# Patient Record
Sex: MALE | Race: BLACK OR AFRICAN AMERICAN | URBAN - METROPOLITAN AREA
[De-identification: names, ages, dates, MRNs, and addresses within clinical notes are randomized per-mention and may not be internally consistent; named-entity substitution may affect disease eponyms.]

---

## 2020-07-14 ENCOUNTER — OFFICE VISIT (OUTPATIENT)
Dept: URBAN - METROPOLITAN AREA CLINIC 97 | Facility: CLINIC | Age: 30
End: 2020-07-14
Payer: COMMERCIAL

## 2020-07-14 VITALS
HEIGHT: 73 IN | WEIGHT: 163 LBS | SYSTOLIC BLOOD PRESSURE: 120 MMHG | DIASTOLIC BLOOD PRESSURE: 64 MMHG | TEMPERATURE: 97.8 F | BODY MASS INDEX: 21.6 KG/M2 | RESPIRATION RATE: 16 BRPM

## 2020-07-14 DIAGNOSIS — K50.80 CROHN'S COLITIS: ICD-10-CM

## 2020-07-14 PROCEDURE — 96413 CHEMO IV INFUSION 1 HR: CPT | Performed by: INTERNAL MEDICINE

## 2020-07-14 PROCEDURE — 96415 CHEMO IV INFUSION ADDL HR: CPT | Performed by: INTERNAL MEDICINE

## 2020-07-14 RX ORDER — INFLIXIMAB 100 MG/10ML
INFUSE 5 MG/KG OVER NO LESS THAN 2 HOUR(S) BY INTRAVENOUS ROUTE  START Q0,2 6 WEEKS AND THEN Q 8 WEEKS FOR 90 DAYS INJECTION, POWDER, LYOPHILIZED, FOR SOLUTION INTRAVENOUS
Qty: 5 | Refills: 3 | Status: ACTIVE | COMMUNITY
Start: 2018-02-19 | End: 1900-01-01

## 2020-11-17 ENCOUNTER — OFFICE VISIT (OUTPATIENT)
Dept: URBAN - METROPOLITAN AREA CLINIC 97 | Facility: CLINIC | Age: 30
End: 2020-11-17

## 2020-11-17 ENCOUNTER — TELEPHONE ENCOUNTER (OUTPATIENT)
Dept: URBAN - METROPOLITAN AREA CLINIC 6 | Facility: CLINIC | Age: 30
End: 2020-11-17

## 2020-11-17 RX ORDER — INFLIXIMAB 100 MG/10ML
INFUSE 5 MG/KG OVER NO LESS THAN 2 HOUR(S) BY INTRAVENOUS ROUTE  START Q0,2 6 WEEKS AND THEN Q 8 WEEKS FOR 90 DAYS INJECTION, POWDER, LYOPHILIZED, FOR SOLUTION INTRAVENOUS
Qty: 5 | Refills: 3 | Status: ACTIVE | COMMUNITY
Start: 2018-02-19 | End: 1900-01-01

## 2020-11-18 ENCOUNTER — WEB ENCOUNTER (OUTPATIENT)
Dept: URBAN - METROPOLITAN AREA CLINIC 115 | Facility: CLINIC | Age: 30
End: 2020-11-18

## 2020-11-18 ENCOUNTER — OFFICE VISIT (OUTPATIENT)
Dept: URBAN - METROPOLITAN AREA CLINIC 115 | Facility: CLINIC | Age: 30
End: 2020-11-18
Payer: COMMERCIAL

## 2020-11-18 ENCOUNTER — LAB OUTSIDE AN ENCOUNTER (OUTPATIENT)
Dept: URBAN - METROPOLITAN AREA CLINIC 115 | Facility: CLINIC | Age: 30
End: 2020-11-18

## 2020-11-18 DIAGNOSIS — L21.8 SEBORRHEA CORPORIS: ICD-10-CM

## 2020-11-18 DIAGNOSIS — L21.0 SEBORRHEA CAPITIS IN ADULT: ICD-10-CM

## 2020-11-18 DIAGNOSIS — K50.80 CROHN'S DISEASE OF BOTH SMALL AND LARGE INTESTINE WITHOUT COMPLICATION: ICD-10-CM

## 2020-11-18 DIAGNOSIS — K50.90 CROHN'S DISEASE: ICD-10-CM

## 2020-11-18 PROCEDURE — 82607 VITAMIN B-12: CPT | Performed by: INTERNAL MEDICINE

## 2020-11-18 PROCEDURE — 86480 TB TEST CELL IMMUN MEASURE: CPT | Performed by: INTERNAL MEDICINE

## 2020-11-18 PROCEDURE — 99214 OFFICE O/P EST MOD 30 MIN: CPT | Performed by: INTERNAL MEDICINE

## 2020-11-18 RX ORDER — SELENIUM SULFIDE 23 MG/ML
AS DIRECTED SHAMPOO TOPICAL
Qty: 1 BOTTLE | Refills: 1 | OUTPATIENT
Start: 2020-11-18 | End: 2021-01-17

## 2020-11-18 RX ORDER — INFLIXIMAB 100 MG/10ML
INFUSE 5 MG/KG OVER NO LESS THAN 2 HOUR(S) BY INTRAVENOUS ROUTE  START Q0,2 6 WEEKS AND THEN Q 8 WEEKS FOR 90 DAYS INJECTION, POWDER, LYOPHILIZED, FOR SOLUTION INTRAVENOUS
Qty: 5 | Refills: 3 | Status: ACTIVE | COMMUNITY
Start: 2018-02-19

## 2020-11-18 RX ORDER — SODIUM, POTASSIUM,MAG SULFATES 17.5-3.13G
344 ML SOLUTION, RECONSTITUTED, ORAL ORAL
Qty: 1 BOX | Refills: 0 | OUTPATIENT
Start: 2020-11-18 | End: 2020-11-19

## 2020-11-18 NOTE — PHYSICAL EXAM CHEST:
no lesions,  no deformities,  no traumatic injuries,  no significant scars are present,  chest wall non-tender,  no masses present, breathing is unlabored without accessory muscle use,normal breath sounds No

## 2020-11-18 NOTE — HPI-TODAY'S VISIT:
31 yo AAM here for annual visit. He had crohn's since 2011 and was on remicade for 8 years. Last colonoscopy was in 2018. He was last seen in office 11/2019 and last remicade was 7/2020. He only received infusions twice this year. He reports no reasons for non compliance. he reports being asymptomatic.

## 2020-12-03 LAB
A/G RATIO: 1.8
ALBUMIN: 4
ALKALINE PHOSPHATASE: 72
ALT (SGPT): 14
ANTI-INFLIXIMAB ANTIBODY: <22
AST (SGOT): 18
BASO (ABSOLUTE): 0
BASOS: 1
BILIRUBIN, TOTAL: 0.5
BUN/CREATININE RATIO: 13
BUN: 13
C-REACTIVE PROTEIN, QUANT: 4
CALCIUM: 9.2
CARBON DIOXIDE, TOTAL: 24
CHLORIDE: 104
COMMENT:: (no result)
CREATININE: 0.97
EGFR IF AFRICN AM: 122
EGFR IF NONAFRICN AM: 106
EOS (ABSOLUTE): 0.1
EOS: 2
GLOBULIN, TOTAL: 2.2
GLUCOSE: 87
HBSAG SCREEN: NEGATIVE
HCV AB: <0.1
HEMATOCRIT: 43.7
HEMATOLOGY COMMENTS:: (no result)
HEMOGLOBIN: 14.8
HEP B CORE AB, TOT: NEGATIVE
HEPATITIS B SURF AB QUANT: <3.1
IMMATURE CELLS: (no result)
IMMATURE GRANS (ABS): 0
IMMATURE GRANULOCYTES: 0
INFLIXIMAB DRUG LEVEL: <0.4
LYMPHS (ABSOLUTE): 1.1
LYMPHS: 25
MCH: 31
MCHC: 33.9
MCV: 92
MONOCYTES(ABSOLUTE): 0.4
MONOCYTES: 10
NEUTROPHILS (ABSOLUTE): 2.7
NEUTROPHILS: 62
NRBC: (no result)
PLATELETS: 214
POTASSIUM: 4.6
PROTEIN, TOTAL: 6.2
QUANTIFERON CRITERIA: (no result)
QUANTIFERON INCUBATION: (no result)
QUANTIFERON MITOGEN VALUE: >10
QUANTIFERON NIL VALUE: 0.88
QUANTIFERON TB1 AG VALUE: 0.67
QUANTIFERON TB2 AG VALUE: 0.54
QUANTIFERON-TB GOLD PLUS: NEGATIVE
RBC: 4.77
RDW: 12.7
SODIUM: 141
VITAMIN B12: 329
WBC: 4.4

## 2020-12-05 ENCOUNTER — TELEPHONE ENCOUNTER (OUTPATIENT)
Dept: URBAN - METROPOLITAN AREA CLINIC 92 | Facility: CLINIC | Age: 30
End: 2020-12-05

## 2020-12-05 RX ORDER — HYDROCORTISONE SODIUM SUCCINATE 100 MG/2ML
AS DIRECTED INJECTION, POWDER, FOR SOLUTION INTRAMUSCULAR; INTRAVENOUS
Qty: 1 | Refills: 0 | OUTPATIENT
Start: 2020-12-05 | End: 2020-12-06

## 2020-12-05 RX ORDER — INFLIXIMAB 100 MG/10ML
AS DIRECTED INJECTION, POWDER, LYOPHILIZED, FOR SOLUTION INTRAVENOUS
Qty: 1 | Refills: 0 | OUTPATIENT
Start: 2020-12-05 | End: 2020-12-06

## 2020-12-12 ENCOUNTER — LAB OUTSIDE AN ENCOUNTER (OUTPATIENT)
Dept: URBAN - METROPOLITAN AREA CLINIC 92 | Facility: CLINIC | Age: 30
End: 2020-12-12

## 2020-12-22 ENCOUNTER — OFFICE VISIT (OUTPATIENT)
Dept: URBAN - METROPOLITAN AREA SURGERY CENTER 13 | Facility: SURGERY CENTER | Age: 30
End: 2020-12-22

## 2021-01-14 ENCOUNTER — OFFICE VISIT (OUTPATIENT)
Dept: URBAN - METROPOLITAN AREA CLINIC 91 | Facility: CLINIC | Age: 31
End: 2021-01-14

## 2021-01-26 ENCOUNTER — CLAIMS CREATED FROM THE CLAIM WINDOW (OUTPATIENT)
Dept: URBAN - METROPOLITAN AREA CLINIC 4 | Facility: CLINIC | Age: 31
End: 2021-01-26
Payer: COMMERCIAL

## 2021-01-26 ENCOUNTER — OFFICE VISIT (OUTPATIENT)
Dept: URBAN - METROPOLITAN AREA SURGERY CENTER 13 | Facility: SURGERY CENTER | Age: 31
End: 2021-01-26
Payer: COMMERCIAL

## 2021-01-26 DIAGNOSIS — K63.89 OTHER SPECIFIED DISEASES OF INTESTINE: ICD-10-CM

## 2021-01-26 DIAGNOSIS — K63.3 ULCER OF INTESTINE: ICD-10-CM

## 2021-01-26 DIAGNOSIS — Z98.0 H/O BILLROTH II OPERATION: ICD-10-CM

## 2021-01-26 DIAGNOSIS — K50.80 CROHN'S COLITIS: ICD-10-CM

## 2021-01-26 PROCEDURE — 45380 COLONOSCOPY AND BIOPSY: CPT | Performed by: INTERNAL MEDICINE

## 2021-01-26 PROCEDURE — 88305 TISSUE EXAM BY PATHOLOGIST: CPT | Performed by: PATHOLOGY

## 2021-01-26 PROCEDURE — G8907 PT DOC NO EVENTS ON DISCHARG: HCPCS | Performed by: INTERNAL MEDICINE

## 2021-01-26 PROCEDURE — 88342 IMHCHEM/IMCYTCHM 1ST ANTB: CPT | Performed by: PATHOLOGY

## 2021-01-26 PROCEDURE — 88341 IMHCHEM/IMCYTCHM EA ADD ANTB: CPT | Performed by: PATHOLOGY

## 2021-01-29 ENCOUNTER — OFFICE VISIT (OUTPATIENT)
Dept: URBAN - METROPOLITAN AREA CLINIC 91 | Facility: CLINIC | Age: 31
End: 2021-01-29
Payer: COMMERCIAL

## 2021-01-29 VITALS
WEIGHT: 167 LBS | DIASTOLIC BLOOD PRESSURE: 77 MMHG | HEART RATE: 86 BPM | TEMPERATURE: 97.5 F | RESPIRATION RATE: 17 BRPM | HEIGHT: 73 IN | SYSTOLIC BLOOD PRESSURE: 116 MMHG | BODY MASS INDEX: 22.13 KG/M2

## 2021-01-29 DIAGNOSIS — K50.80 CROHN'S COLITIS: ICD-10-CM

## 2021-01-29 PROCEDURE — 96413 CHEMO IV INFUSION 1 HR: CPT | Performed by: INTERNAL MEDICINE

## 2021-01-29 PROCEDURE — 96415 CHEMO IV INFUSION ADDL HR: CPT | Performed by: INTERNAL MEDICINE

## 2021-01-29 RX ORDER — INFLIXIMAB 100 MG/10ML
INFUSE 5 MG/KG OVER NO LESS THAN 2 HOUR(S) BY INTRAVENOUS ROUTE  START Q0,2 6 WEEKS AND THEN Q 8 WEEKS FOR 90 DAYS INJECTION, POWDER, LYOPHILIZED, FOR SOLUTION INTRAVENOUS
Qty: 5 | Refills: 3 | Status: ACTIVE | COMMUNITY
Start: 2018-02-19

## 2021-03-01 ENCOUNTER — OFFICE VISIT (OUTPATIENT)
Dept: URBAN - METROPOLITAN AREA CLINIC 114 | Facility: CLINIC | Age: 31
End: 2021-03-01

## 2021-04-22 ENCOUNTER — OFFICE VISIT (OUTPATIENT)
Dept: URBAN - METROPOLITAN AREA CLINIC 91 | Facility: CLINIC | Age: 31
End: 2021-04-22
Payer: COMMERCIAL

## 2021-04-22 VITALS
RESPIRATION RATE: 18 BRPM | HEART RATE: 78 BPM | WEIGHT: 173.6 LBS | HEIGHT: 73 IN | SYSTOLIC BLOOD PRESSURE: 113 MMHG | BODY MASS INDEX: 23.01 KG/M2 | DIASTOLIC BLOOD PRESSURE: 76 MMHG | TEMPERATURE: 95 F

## 2021-04-22 DIAGNOSIS — K50.80 CROHN'S COLITIS: ICD-10-CM

## 2021-04-22 PROCEDURE — 96415 CHEMO IV INFUSION ADDL HR: CPT | Performed by: INTERNAL MEDICINE

## 2021-04-22 PROCEDURE — 96413 CHEMO IV INFUSION 1 HR: CPT | Performed by: INTERNAL MEDICINE

## 2021-04-22 RX ORDER — INFLIXIMAB 100 MG/10ML
INFUSE 5 MG/KG OVER NO LESS THAN 2 HOUR(S) BY INTRAVENOUS ROUTE  START Q0,2 6 WEEKS AND THEN Q 8 WEEKS FOR 90 DAYS INJECTION, POWDER, LYOPHILIZED, FOR SOLUTION INTRAVENOUS
Qty: 5 | Refills: 3 | Status: ACTIVE | COMMUNITY
Start: 2018-02-19

## 2021-06-23 ENCOUNTER — OFFICE VISIT (OUTPATIENT)
Dept: URBAN - METROPOLITAN AREA CLINIC 91 | Facility: CLINIC | Age: 31
End: 2021-06-23
Payer: COMMERCIAL

## 2021-06-23 VITALS
SYSTOLIC BLOOD PRESSURE: 115 MMHG | DIASTOLIC BLOOD PRESSURE: 77 MMHG | HEART RATE: 74 BPM | HEIGHT: 73 IN | BODY MASS INDEX: 23.19 KG/M2 | WEIGHT: 175 LBS | TEMPERATURE: 98.4 F | RESPIRATION RATE: 17 BRPM

## 2021-06-23 DIAGNOSIS — K50.80 CROHN'S COLITIS: ICD-10-CM

## 2021-06-23 PROCEDURE — 96413 CHEMO IV INFUSION 1 HR: CPT | Performed by: INTERNAL MEDICINE

## 2021-06-23 PROCEDURE — 96415 CHEMO IV INFUSION ADDL HR: CPT | Performed by: INTERNAL MEDICINE

## 2021-06-23 RX ORDER — INFLIXIMAB 100 MG/10ML
INFUSE 5 MG/KG OVER NO LESS THAN 2 HOUR(S) BY INTRAVENOUS ROUTE  START Q0,2 6 WEEKS AND THEN Q 8 WEEKS FOR 90 DAYS INJECTION, POWDER, LYOPHILIZED, FOR SOLUTION INTRAVENOUS
Qty: 5 | Refills: 3 | Status: ACTIVE | COMMUNITY
Start: 2018-02-19

## 2021-10-12 ENCOUNTER — TELEPHONE ENCOUNTER (OUTPATIENT)
Dept: URBAN - METROPOLITAN AREA CLINIC 82 | Facility: CLINIC | Age: 31
End: 2021-10-12

## 2021-10-20 ENCOUNTER — OFFICE VISIT (OUTPATIENT)
Dept: URBAN - METROPOLITAN AREA CLINIC 91 | Facility: CLINIC | Age: 31
End: 2021-10-20
Payer: COMMERCIAL

## 2021-10-20 VITALS
HEIGHT: 73 IN | RESPIRATION RATE: 18 BRPM | DIASTOLIC BLOOD PRESSURE: 64 MMHG | BODY MASS INDEX: 22.93 KG/M2 | TEMPERATURE: 98.3 F | HEART RATE: 86 BPM | SYSTOLIC BLOOD PRESSURE: 125 MMHG | WEIGHT: 173 LBS

## 2021-10-20 DIAGNOSIS — K50.80 CROHN'S COLITIS: ICD-10-CM

## 2021-10-20 PROCEDURE — 96413 CHEMO IV INFUSION 1 HR: CPT | Performed by: INTERNAL MEDICINE

## 2021-10-20 PROCEDURE — 96415 CHEMO IV INFUSION ADDL HR: CPT | Performed by: INTERNAL MEDICINE

## 2021-10-20 RX ORDER — INFLIXIMAB 100 MG/10ML
INFUSE 5 MG/KG OVER NO LESS THAN 2 HOUR(S) BY INTRAVENOUS ROUTE  START Q0,2 6 WEEKS AND THEN Q 8 WEEKS FOR 90 DAYS INJECTION, POWDER, LYOPHILIZED, FOR SOLUTION INTRAVENOUS
Qty: 5 | Refills: 3 | Status: ACTIVE | COMMUNITY
Start: 2018-02-19

## 2022-01-27 ENCOUNTER — TELEPHONE ENCOUNTER (OUTPATIENT)
Dept: URBAN - METROPOLITAN AREA CLINIC 115 | Facility: CLINIC | Age: 32
End: 2022-01-27

## 2022-01-31 ENCOUNTER — TELEPHONE ENCOUNTER (OUTPATIENT)
Dept: URBAN - METROPOLITAN AREA CLINIC 91 | Facility: CLINIC | Age: 32
End: 2022-01-31

## 2022-01-31 ENCOUNTER — OFFICE VISIT (OUTPATIENT)
Dept: URBAN - METROPOLITAN AREA CLINIC 91 | Facility: CLINIC | Age: 32
End: 2022-01-31
Payer: COMMERCIAL

## 2022-01-31 VITALS
HEART RATE: 81 BPM | DIASTOLIC BLOOD PRESSURE: 75 MMHG | HEIGHT: 73 IN | RESPIRATION RATE: 16 BRPM | BODY MASS INDEX: 22.19 KG/M2 | SYSTOLIC BLOOD PRESSURE: 132 MMHG | WEIGHT: 167.4 LBS | TEMPERATURE: 97.7 F

## 2022-01-31 DIAGNOSIS — K50.80 CROHN'S COLITIS: ICD-10-CM

## 2022-01-31 PROCEDURE — 96413 CHEMO IV INFUSION 1 HR: CPT | Performed by: INTERNAL MEDICINE

## 2022-01-31 PROCEDURE — 96415 CHEMO IV INFUSION ADDL HR: CPT | Performed by: INTERNAL MEDICINE

## 2022-01-31 RX ORDER — INFLIXIMAB 100 MG/10ML
INFUSE 5 MG/KG OVER NO LESS THAN 2 HOUR(S) BY INTRAVENOUS ROUTE  START Q0,2 6 WEEKS AND THEN Q 8 WEEKS FOR 90 DAYS INJECTION, POWDER, LYOPHILIZED, FOR SOLUTION INTRAVENOUS
Qty: 5 | Refills: 3 | Status: ACTIVE | COMMUNITY
Start: 2018-02-19

## 2022-05-09 ENCOUNTER — OFFICE VISIT (OUTPATIENT)
Dept: URBAN - METROPOLITAN AREA CLINIC 91 | Facility: CLINIC | Age: 32
End: 2022-05-09
Payer: COMMERCIAL

## 2022-05-09 ENCOUNTER — TELEPHONE ENCOUNTER (OUTPATIENT)
Dept: URBAN - METROPOLITAN AREA CLINIC 91 | Facility: CLINIC | Age: 32
End: 2022-05-09

## 2022-05-09 VITALS
TEMPERATURE: 97.6 F | DIASTOLIC BLOOD PRESSURE: 97 MMHG | SYSTOLIC BLOOD PRESSURE: 149 MMHG | BODY MASS INDEX: 22.66 KG/M2 | WEIGHT: 171 LBS | RESPIRATION RATE: 18 BRPM | HEIGHT: 73 IN | HEART RATE: 93 BPM

## 2022-05-09 DIAGNOSIS — K50.80 CROHN'S COLITIS: ICD-10-CM

## 2022-05-09 PROCEDURE — 96415 CHEMO IV INFUSION ADDL HR: CPT | Performed by: INTERNAL MEDICINE

## 2022-05-09 PROCEDURE — 96413 CHEMO IV INFUSION 1 HR: CPT | Performed by: INTERNAL MEDICINE

## 2022-05-09 RX ORDER — INFLIXIMAB 100 MG/10ML
INFUSE 5 MG/KG OVER NO LESS THAN 2 HOUR(S) BY INTRAVENOUS ROUTE  START Q0,2 6 WEEKS AND THEN Q 8 WEEKS FOR 90 DAYS INJECTION, POWDER, LYOPHILIZED, FOR SOLUTION INTRAVENOUS
Qty: 5 | Refills: 3 | Status: ACTIVE | COMMUNITY
Start: 2018-02-19

## 2022-05-18 ENCOUNTER — OFFICE VISIT (OUTPATIENT)
Dept: URBAN - METROPOLITAN AREA CLINIC 115 | Facility: CLINIC | Age: 32
End: 2022-05-18

## 2022-08-04 ENCOUNTER — TELEPHONE ENCOUNTER (OUTPATIENT)
Dept: URBAN - METROPOLITAN AREA CLINIC 91 | Facility: CLINIC | Age: 32
End: 2022-08-04

## 2022-08-04 RX ORDER — ONDANSETRON HYDROCHLORIDE 4 MG/1
AS DIRECTED TABLET, FILM COATED ORAL ONCE
Qty: 4 MILLIGRAM | Refills: 0 | OUTPATIENT
Start: 2022-08-07 | End: 2022-08-08

## 2022-08-04 RX ORDER — INFLIXIMAB 100 MG/10ML
AS DIRECTED INJECTION, POWDER, LYOPHILIZED, FOR SOLUTION INTRAVENOUS
Qty: 100 MILLIGRAMS | Refills: 0 | OUTPATIENT
Start: 2022-08-07 | End: 2022-09-06

## 2022-08-04 RX ORDER — ACETAMINOPHEN 650 MG
2 TABLETS AS NEEDED TABLET, EXTENDED RELEASE ORAL
Qty: 6 TABLET | Refills: 0 | OUTPATIENT
Start: 2022-08-07 | End: 2022-08-08

## 2022-08-19 ENCOUNTER — TELEPHONE ENCOUNTER (OUTPATIENT)
Dept: URBAN - METROPOLITAN AREA CLINIC 91 | Facility: CLINIC | Age: 32
End: 2022-08-19

## 2022-08-19 ENCOUNTER — OFFICE VISIT (OUTPATIENT)
Dept: URBAN - METROPOLITAN AREA CLINIC 91 | Facility: CLINIC | Age: 32
End: 2022-08-19
Payer: COMMERCIAL

## 2022-08-19 VITALS
DIASTOLIC BLOOD PRESSURE: 80 MMHG | SYSTOLIC BLOOD PRESSURE: 131 MMHG | HEIGHT: 73 IN | RESPIRATION RATE: 18 BRPM | TEMPERATURE: 97.5 F | HEART RATE: 86 BPM | WEIGHT: 177.8 LBS | BODY MASS INDEX: 23.56 KG/M2

## 2022-08-19 DIAGNOSIS — K50.80 CROHN'S COLITIS: ICD-10-CM

## 2022-08-19 PROCEDURE — 96413 CHEMO IV INFUSION 1 HR: CPT | Performed by: INTERNAL MEDICINE

## 2022-08-19 PROCEDURE — 96415 CHEMO IV INFUSION ADDL HR: CPT | Performed by: INTERNAL MEDICINE

## 2022-08-19 RX ORDER — INFLIXIMAB 100 MG/10ML
AS DIRECTED INJECTION, POWDER, LYOPHILIZED, FOR SOLUTION INTRAVENOUS
Qty: 100 MILLIGRAMS | Refills: 0 | Status: ACTIVE | COMMUNITY
Start: 2022-08-07 | End: 2022-09-06

## 2022-08-19 RX ORDER — INFLIXIMAB 100 MG/10ML
INFUSE 5 MG/KG OVER NO LESS THAN 2 HOUR(S) BY INTRAVENOUS ROUTE  START Q0,2 6 WEEKS AND THEN Q 8 WEEKS FOR 90 DAYS INJECTION, POWDER, LYOPHILIZED, FOR SOLUTION INTRAVENOUS
Qty: 5 | Refills: 3 | Status: ACTIVE | COMMUNITY
Start: 2018-02-19

## 2022-08-21 ENCOUNTER — TELEPHONE ENCOUNTER (OUTPATIENT)
Dept: URBAN - METROPOLITAN AREA CLINIC 92 | Facility: CLINIC | Age: 32
End: 2022-08-21

## 2022-09-28 ENCOUNTER — LAB OUTSIDE AN ENCOUNTER (OUTPATIENT)
Dept: URBAN - METROPOLITAN AREA CLINIC 115 | Facility: CLINIC | Age: 32
End: 2022-09-28

## 2022-09-28 ENCOUNTER — OFFICE VISIT (OUTPATIENT)
Dept: URBAN - METROPOLITAN AREA CLINIC 115 | Facility: CLINIC | Age: 32
End: 2022-09-28
Payer: COMMERCIAL

## 2022-09-28 VITALS
TEMPERATURE: 98.2 F | DIASTOLIC BLOOD PRESSURE: 69 MMHG | HEART RATE: 77 BPM | SYSTOLIC BLOOD PRESSURE: 137 MMHG | WEIGHT: 177 LBS | HEIGHT: 73 IN | BODY MASS INDEX: 23.46 KG/M2

## 2022-09-28 DIAGNOSIS — L21.8 SEBORRHEA CORPORIS: ICD-10-CM

## 2022-09-28 DIAGNOSIS — K50.80 CROHN'S DISEASE OF BOTH SMALL AND LARGE INTESTINE WITHOUT COMPLICATION: ICD-10-CM

## 2022-09-28 PROBLEM — 201236001 SEBORRHEA CORPORIS: Status: ACTIVE | Noted: 2022-09-28

## 2022-09-28 PROCEDURE — 99214 OFFICE O/P EST MOD 30 MIN: CPT | Performed by: INTERNAL MEDICINE

## 2022-09-28 RX ORDER — ACETAMINOPHEN 650 MG
2 TABLETS AS NEEDED TABLET, EXTENDED RELEASE ORAL
Qty: 6 TABLET | Refills: 0 | OUTPATIENT
Start: 2022-09-28 | End: 2022-09-29

## 2022-09-28 RX ORDER — INFLIXIMAB 100 MG/10ML
INFUSE 5 MG/KG OVER NO LESS THAN 2 HOUR(S) BY INTRAVENOUS ROUTE  START Q0,2 6 WEEKS AND THEN Q 8 WEEKS FOR 90 DAYS INJECTION, POWDER, LYOPHILIZED, FOR SOLUTION INTRAVENOUS
Qty: 5 | Refills: 3 | Status: ACTIVE | COMMUNITY
Start: 2018-02-19

## 2022-09-28 RX ORDER — ONDANSETRON HYDROCHLORIDE 4 MG/1
AS DIRECTED TABLET, FILM COATED ORAL ONCE
Qty: 4 MILLIGRAM | Refills: 0 | OUTPATIENT
Start: 2022-09-28 | End: 2022-09-29

## 2022-09-28 RX ORDER — INFLIXIMAB 100 MG/10ML
AS DIRECTED INJECTION, POWDER, LYOPHILIZED, FOR SOLUTION INTRAVENOUS
Qty: 100 MILLIGRAMS | Refills: 0 | OUTPATIENT
Start: 2022-09-28 | End: 2022-10-28

## 2022-09-28 RX ORDER — CYANOCOBALAMIN 1000 UG/ML
1 ML INJECTION INTRAMUSCULAR; SUBCUTANEOUS EVERY OTHER WEEK
Qty: 10 ML | Refills: 2 | OUTPATIENT
Start: 2022-09-28 | End: 2023-06-25

## 2022-09-28 NOTE — HPI-TODAY'S VISIT:
31 yo AAM here for annual visit. He had crohn's since 2011 and was on remicade for 8 years. Last colonoscopy was in 2018. He was last seen in office 11/2019 and last remicade was 7/2020. He only received infusions twice this year. He reports no reasons for non compliance. he reports being asymptomatic.  9/28/22 : Mr. Murrieta was seen today after almost 2 years for Crohn's evaluation Crohn's follow-up treatment and also for labs and renewing his prescription for infusion.  Patient has been getting Remicade 5 mg/kg 80 every 8 weeks.  Patient has been noncompliant with office visits.  Last TB testing was all in 2020.  Patient denies any flareups recently.  Denies any joint pains arthritis.  He is tolerating his medications well.  Denies any complications from Crohn's disease recently last colonoscopy January 2021 showed active disease at anastomosis.  He has been vaccinated however he is antibody levels for hepatitis B viral load he also reports having alopecia and also losing hair and beard.  Denies any fatigue in the past he was told he has B12 levels are low normal and has been replaced with B12 sublingual.  Denies any new medications denies any visual symptoms.

## 2022-10-03 LAB
A/G RATIO: 1.7
ABSOLUTE BASOPHILS: 30
ABSOLUTE EOSINOPHILS: 30
ABSOLUTE LYMPHOCYTES: 1780
ABSOLUTE MONOCYTES: 430
ABSOLUTE NEUTROPHILS: 2030
ALBUMIN: 3.5
ALKALINE PHOSPHATASE: 54
ALT (SGPT): 17
AST (SGOT): 17
BASOPHILS: 0.7
BILIRUBIN, TOTAL: 0.6
BUN/CREATININE RATIO: (no result)
BUN: 12
C-REACTIVE PROTEIN, QUANT: 0.5
CALCIUM: 8.8
CARBON DIOXIDE, TOTAL: 24
CHLORIDE: 108
CREATININE: 1.03
EGFR: 101
EOSINOPHILS: 0.7
GLOBULIN, TOTAL: 2.1
GLUCOSE: 86
HEMATOCRIT: 45.5
HEMOGLOBIN: 15.3
LYMPHOCYTES: 41.4
MCH: 30.4
MCHC: 33.6
MCV: 90.5
MITOGEN-NIL: >10
MONOCYTES: 10
MPV: 12.2
NEUTROPHILS: 47.2
NIL: 0.19
PLATELET COUNT: 202
POTASSIUM: 4.1
PROTEIN, TOTAL: 5.6
QUANTIFERON(R)-TB GOLD: NEGATIVE
RDW: 12.8
RED BLOOD CELL COUNT: 5.03
SODIUM: 142
TB1-NIL: 0.15
TB2-NIL: 0.16
VITAMIN B12: 326
WHITE BLOOD CELL COUNT: 4.3

## 2022-10-14 ENCOUNTER — OFFICE VISIT (OUTPATIENT)
Dept: URBAN - METROPOLITAN AREA CLINIC 91 | Facility: CLINIC | Age: 32
End: 2022-10-14

## 2022-12-08 ENCOUNTER — OFFICE VISIT (OUTPATIENT)
Dept: URBAN - METROPOLITAN AREA CLINIC 91 | Facility: CLINIC | Age: 32
End: 2022-12-08
Payer: COMMERCIAL

## 2022-12-08 VITALS
BODY MASS INDEX: 24.23 KG/M2 | DIASTOLIC BLOOD PRESSURE: 81 MMHG | SYSTOLIC BLOOD PRESSURE: 158 MMHG | RESPIRATION RATE: 18 BRPM | HEIGHT: 73 IN | TEMPERATURE: 97.7 F | WEIGHT: 182.8 LBS | HEART RATE: 77 BPM

## 2022-12-08 DIAGNOSIS — K50.80 CROHN'S COLITIS: ICD-10-CM

## 2022-12-08 PROCEDURE — 96413 CHEMO IV INFUSION 1 HR: CPT | Performed by: INTERNAL MEDICINE

## 2022-12-08 PROCEDURE — 96415 CHEMO IV INFUSION ADDL HR: CPT | Performed by: INTERNAL MEDICINE

## 2022-12-08 RX ORDER — CYANOCOBALAMIN 1000 UG/ML
1 ML INJECTION INTRAMUSCULAR; SUBCUTANEOUS EVERY OTHER WEEK
Qty: 10 ML | Refills: 2 | Status: ACTIVE | COMMUNITY
Start: 2022-09-28 | End: 2023-06-25

## 2022-12-08 RX ORDER — INFLIXIMAB 100 MG/10ML
INFUSE 5 MG/KG OVER NO LESS THAN 2 HOUR(S) BY INTRAVENOUS ROUTE  START Q0,2 6 WEEKS AND THEN Q 8 WEEKS FOR 90 DAYS INJECTION, POWDER, LYOPHILIZED, FOR SOLUTION INTRAVENOUS
Qty: 5 | Refills: 3 | Status: ACTIVE | COMMUNITY
Start: 2018-02-19

## 2022-12-09 ENCOUNTER — OFFICE VISIT (OUTPATIENT)
Dept: URBAN - METROPOLITAN AREA CLINIC 91 | Facility: CLINIC | Age: 32
End: 2022-12-09

## 2022-12-19 ENCOUNTER — OFFICE VISIT (OUTPATIENT)
Dept: URBAN - METROPOLITAN AREA CLINIC 91 | Facility: CLINIC | Age: 32
End: 2022-12-19

## 2023-02-02 ENCOUNTER — OFFICE VISIT (OUTPATIENT)
Dept: URBAN - METROPOLITAN AREA CLINIC 91 | Facility: CLINIC | Age: 33
End: 2023-02-02

## 2023-02-02 ENCOUNTER — TELEPHONE ENCOUNTER (OUTPATIENT)
Dept: URBAN - METROPOLITAN AREA CLINIC 97 | Facility: CLINIC | Age: 33
End: 2023-02-02

## 2023-02-10 ENCOUNTER — TELEPHONE ENCOUNTER (OUTPATIENT)
Dept: URBAN - METROPOLITAN AREA CLINIC 23 | Facility: CLINIC | Age: 33
End: 2023-02-10

## 2023-02-17 ENCOUNTER — TELEPHONE ENCOUNTER (OUTPATIENT)
Dept: URBAN - METROPOLITAN AREA CLINIC 97 | Facility: CLINIC | Age: 33
End: 2023-02-17

## 2023-02-22 ENCOUNTER — TELEPHONE ENCOUNTER (OUTPATIENT)
Dept: URBAN - METROPOLITAN AREA CLINIC 115 | Facility: CLINIC | Age: 33
End: 2023-02-22

## 2023-02-22 RX ORDER — ACETAMINOPHEN 650 MG
2 TABLETS AS NEEDED TABLET, EXTENDED RELEASE ORAL
Qty: 6 | Refills: 0 | OUTPATIENT
Start: 2023-02-23 | End: 2023-02-24

## 2023-02-22 RX ORDER — METHYLPREDNISOLONE 4 MG/1
1 TABLET IN THE MORNING WITH FOOD OR MILK TABLET ORAL ONCE A DAY
Qty: 21 TABLET | Refills: 0 | OUTPATIENT
Start: 2023-02-23 | End: 2023-03-02

## 2023-02-22 RX ORDER — INFLIXIMAB 100 MG/10ML
AS DIRECTED INJECTION, POWDER, LYOPHILIZED, FOR SOLUTION INTRAVENOUS
Qty: 100 | Refills: 0 | OUTPATIENT
Start: 2023-02-23 | End: 2023-03-25

## 2023-02-27 ENCOUNTER — TELEPHONE ENCOUNTER (OUTPATIENT)
Dept: URBAN - METROPOLITAN AREA CLINIC 97 | Facility: CLINIC | Age: 33
End: 2023-02-27

## 2023-03-02 ENCOUNTER — TELEPHONE ENCOUNTER (OUTPATIENT)
Dept: URBAN - METROPOLITAN AREA CLINIC 82 | Facility: CLINIC | Age: 33
End: 2023-03-02

## 2023-03-02 PROBLEM — 71833008: Status: ACTIVE | Noted: 2020-11-18

## 2023-03-02 RX ORDER — ACETAMINOPHEN 650 MG
2 TABLETS AS NEEDED TABLET, EXTENDED RELEASE ORAL
Qty: 6 | Refills: 0 | OUTPATIENT
Start: 2023-03-02 | End: 2023-03-03

## 2023-03-02 RX ORDER — DIPHENHYDRAMINE HCL 2 %
1 CAPSULE AT BEDTIME AS NEEDED CREAM (GRAM) TOPICAL ONCE A DAY
Qty: 30 | Refills: 0 | OUTPATIENT
Start: 2023-03-02 | End: 2023-04-01

## 2023-03-02 RX ORDER — ONDANSETRON HYDROCHLORIDE 4 MG/1
1 TABLET TABLET, FILM COATED ORAL ONCE A DAY
Qty: 1 | Refills: 0 | OUTPATIENT
Start: 2023-03-02

## 2023-03-02 RX ORDER — INFLIXIMAB 100 MG/10ML
AS DIRECTED INJECTION, POWDER, LYOPHILIZED, FOR SOLUTION INTRAVENOUS
Qty: 100 | Refills: 0 | OUTPATIENT
Start: 2023-03-02 | End: 2023-04-01

## 2023-03-10 ENCOUNTER — ERX REFILL RESPONSE (OUTPATIENT)
Dept: URBAN - METROPOLITAN AREA CLINIC 115 | Facility: CLINIC | Age: 33
End: 2023-03-10

## 2023-03-10 RX ORDER — CETIRIZINE HYDROCHLORIDE 10 MG/1
TAKE 1 TABLET BY MOUTH EVERY DAY FOR 30 DAYS TABLET ORAL
Qty: 90 TABLET | Refills: 1 | OUTPATIENT

## 2023-03-16 ENCOUNTER — TELEPHONE ENCOUNTER (OUTPATIENT)
Dept: URBAN - METROPOLITAN AREA CLINIC 92 | Facility: CLINIC | Age: 33
End: 2023-03-16

## 2023-03-29 ENCOUNTER — OFFICE VISIT (OUTPATIENT)
Dept: URBAN - METROPOLITAN AREA CLINIC 97 | Facility: CLINIC | Age: 33
End: 2023-03-29

## 2023-03-29 RX ORDER — ONDANSETRON HYDROCHLORIDE 4 MG/1
1 TABLET TABLET, FILM COATED ORAL ONCE A DAY
Qty: 1 | Refills: 0 | Status: ACTIVE | COMMUNITY
Start: 2023-03-02

## 2023-03-29 RX ORDER — CYANOCOBALAMIN 1000 UG/ML
1 ML INJECTION INTRAMUSCULAR; SUBCUTANEOUS EVERY OTHER WEEK
Qty: 10 ML | Refills: 2 | Status: ACTIVE | COMMUNITY
Start: 2022-09-28 | End: 2023-06-25

## 2023-03-29 RX ORDER — INFLIXIMAB 100 MG/10ML
INFUSE 5 MG/KG OVER NO LESS THAN 2 HOUR(S) BY INTRAVENOUS ROUTE  START Q0,2 6 WEEKS AND THEN Q 8 WEEKS FOR 90 DAYS INJECTION, POWDER, LYOPHILIZED, FOR SOLUTION INTRAVENOUS
Qty: 5 | Refills: 3 | Status: ACTIVE | COMMUNITY
Start: 2018-02-19

## 2023-03-29 RX ORDER — DIPHENHYDRAMINE HCL 2 %
1 CAPSULE AT BEDTIME AS NEEDED CREAM (GRAM) TOPICAL ONCE A DAY
Qty: 30 | Refills: 0 | Status: ACTIVE | COMMUNITY
Start: 2023-03-02 | End: 2023-04-01

## 2023-03-29 RX ORDER — CETIRIZINE HYDROCHLORIDE 10 MG/1
TAKE 1 TABLET BY MOUTH EVERY DAY FOR 30 DAYS TABLET ORAL
Qty: 90 TABLET | Refills: 1 | Status: ACTIVE | COMMUNITY

## 2023-03-29 RX ORDER — INFLIXIMAB 100 MG/10ML
AS DIRECTED INJECTION, POWDER, LYOPHILIZED, FOR SOLUTION INTRAVENOUS
Qty: 100 | Refills: 0 | Status: ACTIVE | COMMUNITY
Start: 2023-03-02 | End: 2023-04-01

## 2023-04-06 ENCOUNTER — OFFICE VISIT (OUTPATIENT)
Dept: URBAN - METROPOLITAN AREA CLINIC 91 | Facility: CLINIC | Age: 33
End: 2023-04-06
Payer: COMMERCIAL

## 2023-04-06 DIAGNOSIS — K50.80 CROHN'S COLITIS: ICD-10-CM

## 2023-04-06 PROCEDURE — 96415 CHEMO IV INFUSION ADDL HR: CPT | Performed by: INTERNAL MEDICINE

## 2023-04-06 PROCEDURE — 96413 CHEMO IV INFUSION 1 HR: CPT | Performed by: INTERNAL MEDICINE

## 2023-04-06 RX ORDER — ONDANSETRON HYDROCHLORIDE 4 MG/1
1 TABLET TABLET, FILM COATED ORAL ONCE A DAY
Qty: 1 | Refills: 0 | Status: ACTIVE | COMMUNITY
Start: 2023-03-02

## 2023-04-06 RX ORDER — CETIRIZINE HYDROCHLORIDE 10 MG/1
TAKE 1 TABLET BY MOUTH EVERY DAY FOR 30 DAYS TABLET ORAL
Qty: 90 TABLET | Refills: 1 | Status: ACTIVE | COMMUNITY

## 2023-04-06 RX ORDER — INFLIXIMAB 100 MG/10ML
INFUSE 5 MG/KG OVER NO LESS THAN 2 HOUR(S) BY INTRAVENOUS ROUTE  START Q0,2 6 WEEKS AND THEN Q 8 WEEKS FOR 90 DAYS INJECTION, POWDER, LYOPHILIZED, FOR SOLUTION INTRAVENOUS
Qty: 5 | Refills: 3 | Status: ACTIVE | COMMUNITY
Start: 2018-02-19

## 2023-04-06 RX ORDER — CYANOCOBALAMIN 1000 UG/ML
1 ML INJECTION INTRAMUSCULAR; SUBCUTANEOUS EVERY OTHER WEEK
Qty: 10 ML | Refills: 2 | Status: ACTIVE | COMMUNITY
Start: 2022-09-28 | End: 2023-06-25

## 2023-05-24 ENCOUNTER — OFFICE VISIT (OUTPATIENT)
Dept: URBAN - METROPOLITAN AREA CLINIC 97 | Facility: CLINIC | Age: 33
End: 2023-05-24

## 2023-05-24 RX ORDER — CETIRIZINE HYDROCHLORIDE 10 MG/1
TAKE 1 TABLET BY MOUTH EVERY DAY FOR 30 DAYS TABLET ORAL
Qty: 90 TABLET | Refills: 1 | Status: ACTIVE | COMMUNITY

## 2023-05-24 RX ORDER — ONDANSETRON HYDROCHLORIDE 4 MG/1
1 TABLET TABLET, FILM COATED ORAL ONCE A DAY
Qty: 1 | Refills: 0 | Status: ACTIVE | COMMUNITY
Start: 2023-03-02

## 2023-05-24 RX ORDER — INFLIXIMAB 100 MG/10ML
INFUSE 5 MG/KG OVER NO LESS THAN 2 HOUR(S) BY INTRAVENOUS ROUTE  START Q0,2 6 WEEKS AND THEN Q 8 WEEKS FOR 90 DAYS INJECTION, POWDER, LYOPHILIZED, FOR SOLUTION INTRAVENOUS
Qty: 5 | Refills: 3 | Status: ACTIVE | COMMUNITY
Start: 2018-02-19

## 2023-05-24 RX ORDER — CYANOCOBALAMIN 1000 UG/ML
1 ML INJECTION INTRAMUSCULAR; SUBCUTANEOUS EVERY OTHER WEEK
Qty: 10 ML | Refills: 2 | Status: ACTIVE | COMMUNITY
Start: 2022-09-28 | End: 2023-06-25

## 2023-05-31 ENCOUNTER — OFFICE VISIT (OUTPATIENT)
Dept: URBAN - METROPOLITAN AREA CLINIC 97 | Facility: CLINIC | Age: 33
End: 2023-05-31

## 2023-06-05 ENCOUNTER — OFFICE VISIT (OUTPATIENT)
Dept: URBAN - METROPOLITAN AREA CLINIC 91 | Facility: CLINIC | Age: 33
End: 2023-06-05
Payer: COMMERCIAL

## 2023-06-05 VITALS
RESPIRATION RATE: 18 BRPM | BODY MASS INDEX: 24.39 KG/M2 | TEMPERATURE: 98.1 F | HEART RATE: 96 BPM | DIASTOLIC BLOOD PRESSURE: 60 MMHG | SYSTOLIC BLOOD PRESSURE: 143 MMHG | WEIGHT: 184 LBS | HEIGHT: 73 IN

## 2023-06-05 DIAGNOSIS — K50.80 CROHN'S DISEASE OF BOTH SMALL AND LARGE INTESTINE WITHOUT COMPLICATION: ICD-10-CM

## 2023-06-05 PROCEDURE — 96413 CHEMO IV INFUSION 1 HR: CPT | Performed by: INTERNAL MEDICINE

## 2023-06-05 PROCEDURE — 96415 CHEMO IV INFUSION ADDL HR: CPT | Performed by: INTERNAL MEDICINE

## 2023-06-05 RX ORDER — CETIRIZINE HYDROCHLORIDE 10 MG/1
TAKE 1 TABLET BY MOUTH EVERY DAY FOR 30 DAYS TABLET ORAL
Qty: 90 TABLET | Refills: 1 | Status: ACTIVE | COMMUNITY

## 2023-06-05 RX ORDER — INFLIXIMAB 100 MG/10ML
INFUSE 5 MG/KG OVER NO LESS THAN 2 HOUR(S) BY INTRAVENOUS ROUTE  START Q0,2 6 WEEKS AND THEN Q 8 WEEKS FOR 90 DAYS INJECTION, POWDER, LYOPHILIZED, FOR SOLUTION INTRAVENOUS
Qty: 5 | Refills: 3 | Status: ACTIVE | COMMUNITY
Start: 2018-02-19

## 2023-06-05 RX ORDER — ONDANSETRON HYDROCHLORIDE 4 MG/1
1 TABLET TABLET, FILM COATED ORAL ONCE A DAY
Qty: 1 | Refills: 0 | Status: ACTIVE | COMMUNITY
Start: 2023-03-02

## 2023-06-05 RX ORDER — CYANOCOBALAMIN 1000 UG/ML
1 ML INJECTION INTRAMUSCULAR; SUBCUTANEOUS EVERY OTHER WEEK
Qty: 10 ML | Refills: 2 | Status: ACTIVE | COMMUNITY
Start: 2022-09-28 | End: 2023-06-25

## 2023-07-19 ENCOUNTER — OFFICE VISIT (OUTPATIENT)
Dept: URBAN - METROPOLITAN AREA CLINIC 97 | Facility: CLINIC | Age: 33
End: 2023-07-19
Payer: COMMERCIAL

## 2023-07-19 VITALS
RESPIRATION RATE: 18 BRPM | BODY MASS INDEX: 23.99 KG/M2 | HEIGHT: 73 IN | WEIGHT: 181 LBS | SYSTOLIC BLOOD PRESSURE: 117 MMHG | DIASTOLIC BLOOD PRESSURE: 77 MMHG | TEMPERATURE: 97.7 F | HEART RATE: 79 BPM

## 2023-07-19 DIAGNOSIS — K50.80 CROHN'S COLITIS: ICD-10-CM

## 2023-07-19 PROCEDURE — 96415 CHEMO IV INFUSION ADDL HR: CPT | Performed by: INTERNAL MEDICINE

## 2023-07-19 PROCEDURE — 96413 CHEMO IV INFUSION 1 HR: CPT | Performed by: INTERNAL MEDICINE

## 2023-07-19 RX ORDER — CETIRIZINE HYDROCHLORIDE 10 MG/1
TAKE 1 TABLET BY MOUTH EVERY DAY FOR 30 DAYS TABLET ORAL
Qty: 90 TABLET | Refills: 1 | Status: ACTIVE | COMMUNITY

## 2023-07-19 RX ORDER — ONDANSETRON HYDROCHLORIDE 4 MG/1
1 TABLET TABLET, FILM COATED ORAL ONCE A DAY
Qty: 1 | Refills: 0 | Status: ACTIVE | COMMUNITY
Start: 2023-03-02

## 2023-07-19 RX ORDER — INFLIXIMAB 100 MG/10ML
INFUSE 5 MG/KG OVER NO LESS THAN 2 HOUR(S) BY INTRAVENOUS ROUTE  START Q0,2 6 WEEKS AND THEN Q 8 WEEKS FOR 90 DAYS INJECTION, POWDER, LYOPHILIZED, FOR SOLUTION INTRAVENOUS
Qty: 5 | Refills: 3 | Status: ACTIVE | COMMUNITY
Start: 2018-02-19

## 2023-10-23 ENCOUNTER — OFFICE VISIT (OUTPATIENT)
Dept: URBAN - METROPOLITAN AREA CLINIC 97 | Facility: CLINIC | Age: 33
End: 2023-10-23
Payer: COMMERCIAL

## 2023-10-23 ENCOUNTER — TELEPHONE ENCOUNTER (OUTPATIENT)
Dept: URBAN - METROPOLITAN AREA CLINIC 97 | Facility: CLINIC | Age: 33
End: 2023-10-23

## 2023-10-23 VITALS
RESPIRATION RATE: 18 BRPM | SYSTOLIC BLOOD PRESSURE: 144 MMHG | TEMPERATURE: 97.9 F | WEIGHT: 175 LBS | HEART RATE: 83 BPM | BODY MASS INDEX: 23.19 KG/M2 | DIASTOLIC BLOOD PRESSURE: 79 MMHG | HEIGHT: 73 IN

## 2023-10-23 DIAGNOSIS — K50.80 CROHN'S DISEASE OF BOTH SMALL AND LARGE INTESTINE WITHOUT COMPLICATION: ICD-10-CM

## 2023-10-23 PROCEDURE — 96415 CHEMO IV INFUSION ADDL HR: CPT | Performed by: INTERNAL MEDICINE

## 2023-10-23 PROCEDURE — 96413 CHEMO IV INFUSION 1 HR: CPT | Performed by: INTERNAL MEDICINE

## 2023-10-23 RX ORDER — INFLIXIMAB 100 MG/10ML
INFUSE 5 MG/KG OVER NO LESS THAN 2 HOUR(S) BY INTRAVENOUS ROUTE  START Q0,2 6 WEEKS AND THEN Q 8 WEEKS FOR 90 DAYS INJECTION, POWDER, LYOPHILIZED, FOR SOLUTION INTRAVENOUS
Qty: 5 | Refills: 3 | Status: ACTIVE | COMMUNITY
Start: 2018-02-19

## 2023-10-23 RX ORDER — CETIRIZINE HYDROCHLORIDE 10 MG/1
TAKE 1 TABLET BY MOUTH EVERY DAY FOR 30 DAYS TABLET ORAL
Qty: 90 TABLET | Refills: 1 | Status: ACTIVE | COMMUNITY

## 2023-10-23 RX ORDER — ONDANSETRON HYDROCHLORIDE 4 MG/1
1 TABLET TABLET, FILM COATED ORAL ONCE A DAY
Qty: 1 | Refills: 0 | Status: ACTIVE | COMMUNITY
Start: 2023-03-02

## 2023-12-18 ENCOUNTER — OFFICE VISIT (OUTPATIENT)
Dept: URBAN - METROPOLITAN AREA CLINIC 97 | Facility: CLINIC | Age: 33
End: 2023-12-18
Payer: COMMERCIAL

## 2023-12-18 VITALS
SYSTOLIC BLOOD PRESSURE: 137 MMHG | RESPIRATION RATE: 18 BRPM | HEART RATE: 91 BPM | WEIGHT: 183 LBS | HEIGHT: 73 IN | TEMPERATURE: 99.3 F | BODY MASS INDEX: 24.25 KG/M2 | DIASTOLIC BLOOD PRESSURE: 84 MMHG

## 2023-12-18 DIAGNOSIS — K50.80 CROHN'S COLITIS: ICD-10-CM

## 2023-12-18 PROCEDURE — 96413 CHEMO IV INFUSION 1 HR: CPT | Performed by: INTERNAL MEDICINE

## 2023-12-18 PROCEDURE — 96375 TX/PRO/DX INJ NEW DRUG ADDON: CPT | Performed by: INTERNAL MEDICINE

## 2023-12-18 PROCEDURE — 96415 CHEMO IV INFUSION ADDL HR: CPT | Performed by: INTERNAL MEDICINE

## 2023-12-18 RX ORDER — CETIRIZINE HYDROCHLORIDE 10 MG/1
TAKE 1 TABLET BY MOUTH EVERY DAY FOR 30 DAYS TABLET ORAL
Qty: 90 TABLET | Refills: 1 | Status: ACTIVE | COMMUNITY

## 2023-12-18 RX ORDER — ONDANSETRON HYDROCHLORIDE 4 MG/1
1 TABLET TABLET, FILM COATED ORAL ONCE A DAY
Qty: 1 | Refills: 0 | Status: ACTIVE | COMMUNITY
Start: 2023-03-02

## 2023-12-18 RX ORDER — INFLIXIMAB 100 MG/10ML
INFUSE 5 MG/KG OVER NO LESS THAN 2 HOUR(S) BY INTRAVENOUS ROUTE  START Q0,2 6 WEEKS AND THEN Q 8 WEEKS FOR 90 DAYS INJECTION, POWDER, LYOPHILIZED, FOR SOLUTION INTRAVENOUS
Qty: 5 | Refills: 3 | Status: ACTIVE | COMMUNITY
Start: 2018-02-19

## 2024-01-01 ENCOUNTER — WEB ENCOUNTER (OUTPATIENT)
Age: 34
End: 2024-01-01

## 2024-01-01 RX ORDER — METRONIDAZOLE 500 MG/1
1 TABLET TABLET ORAL THREE TIMES A DAY
Qty: 30 TABLET | Refills: 0 | OUTPATIENT
Start: 2024-01-01

## 2024-01-01 RX ORDER — PREDNISONE 10 MG/1
1 TABLET TABLET ORAL ONCE A DAY
Qty: 7 TABLET | Refills: 0 | OUTPATIENT
Start: 2024-01-01

## 2024-01-10 ENCOUNTER — OFFICE VISIT (OUTPATIENT)
Dept: URBAN - METROPOLITAN AREA CLINIC 115 | Facility: CLINIC | Age: 34
End: 2024-01-10
Payer: COMMERCIAL

## 2024-01-10 ENCOUNTER — LAB OUTSIDE AN ENCOUNTER (OUTPATIENT)
Dept: URBAN - METROPOLITAN AREA CLINIC 115 | Facility: CLINIC | Age: 34
End: 2024-01-10

## 2024-01-10 ENCOUNTER — DASHBOARD ENCOUNTERS (OUTPATIENT)
Age: 34
End: 2024-01-10

## 2024-01-10 VITALS
DIASTOLIC BLOOD PRESSURE: 79 MMHG | BODY MASS INDEX: 23.72 KG/M2 | WEIGHT: 179 LBS | SYSTOLIC BLOOD PRESSURE: 115 MMHG | HEIGHT: 73 IN | TEMPERATURE: 98 F | HEART RATE: 94 BPM

## 2024-01-10 DIAGNOSIS — L21.8 SEBORRHEA CORPORIS: ICD-10-CM

## 2024-01-10 DIAGNOSIS — K50.80 CROHN'S DISEASE OF BOTH SMALL AND LARGE INTESTINE WITHOUT COMPLICATION: ICD-10-CM

## 2024-01-10 PROCEDURE — 99214 OFFICE O/P EST MOD 30 MIN: CPT | Performed by: INTERNAL MEDICINE

## 2024-01-10 RX ORDER — CETIRIZINE HYDROCHLORIDE 10 MG/1
TAKE 1 TABLET BY MOUTH EVERY DAY FOR 30 DAYS TABLET ORAL
Qty: 90 TABLET | Refills: 1 | Status: ACTIVE | COMMUNITY

## 2024-01-10 RX ORDER — POLYETHYLENE GLYCOL 3350 17 G/DOSE
AS DIRECTED PRIOR TO COLONOSCOPY POWDER (GRAM) ORAL ONCE A DAY
Qty: 238  GRAM | Refills: 0 | OUTPATIENT
Start: 2024-01-10 | End: 2024-01-11

## 2024-01-10 RX ORDER — METRONIDAZOLE 500 MG/1
1 TABLET TABLET ORAL THREE TIMES A DAY
Qty: 30 TABLET | Refills: 0 | Status: ACTIVE | COMMUNITY
Start: 2024-01-01

## 2024-01-10 RX ORDER — INFLIXIMAB 100 MG/10ML
INFUSE 5 MG/KG OVER NO LESS THAN 2 HOUR(S) BY INTRAVENOUS ROUTE  START Q0,2 6 WEEKS AND THEN Q 8 WEEKS FOR 90 DAYS INJECTION, POWDER, LYOPHILIZED, FOR SOLUTION INTRAVENOUS
Qty: 5 | Refills: 3 | Status: ACTIVE | COMMUNITY
Start: 2018-02-19

## 2024-01-10 RX ORDER — PREDNISONE 10 MG/1
1 TABLET TABLET ORAL ONCE A DAY
Qty: 7 TABLET | Refills: 0 | Status: ACTIVE | COMMUNITY
Start: 2024-01-01

## 2024-01-10 RX ORDER — INFLIXIMAB 100 MG/10ML
AS DIRECTED INJECTION, POWDER, LYOPHILIZED, FOR SOLUTION INTRAVENOUS
OUTPATIENT
Start: 2024-01-10

## 2024-01-10 RX ORDER — ONDANSETRON HYDROCHLORIDE 4 MG/1
1 TABLET TABLET, FILM COATED ORAL ONCE A DAY
Qty: 1 | Refills: 0 | Status: ACTIVE | COMMUNITY
Start: 2023-03-02

## 2024-01-10 NOTE — HPI-TODAY'S VISIT:
33-year-old male with a history of ileocolonic Crohn's disease complicated with a history of bowel obstruction requiring ileocolonic anastomosis currently has been on Remicade every 8 weeks 5 mg/kg came into the office for his yearly follow-up.  He has not had a TB testing in the past year.  Patient reports after 2 weeks after receiving the Remicade infusion she he started having abdominal pain cramping and diarrhea denies any sick contacts.  Patient stated he responded well after he had taken Medrol Dosepak.  Denies any unintentional weight loss denies any NSAID use.  Prior colonoscopy procedures for 2021 were reviewed.  Patient denies any nausea vomiting denies any bowel obstructive symptoms since he was last seen.

## 2024-02-12 ENCOUNTER — INF (OUTPATIENT)
Dept: URBAN - METROPOLITAN AREA CLINIC 97 | Facility: CLINIC | Age: 34
End: 2024-02-12
Payer: COMMERCIAL

## 2024-02-12 DIAGNOSIS — K50.80 CROHN'S DISEASE OF BOTH SMALL AND LARGE INTESTINE WITHOUT COMPLICATION: ICD-10-CM

## 2024-02-12 PROCEDURE — 96413 CHEMO IV INFUSION 1 HR: CPT | Performed by: INTERNAL MEDICINE

## 2024-02-12 PROCEDURE — 96375 TX/PRO/DX INJ NEW DRUG ADDON: CPT | Performed by: INTERNAL MEDICINE

## 2024-02-12 PROCEDURE — 96415 CHEMO IV INFUSION ADDL HR: CPT | Performed by: INTERNAL MEDICINE

## 2024-02-12 RX ORDER — INFLIXIMAB 100 MG/10ML
INFUSE 5 MG/KG OVER NO LESS THAN 2 HOUR(S) BY INTRAVENOUS ROUTE  START Q0,2 6 WEEKS AND THEN Q 8 WEEKS FOR 90 DAYS INJECTION, POWDER, LYOPHILIZED, FOR SOLUTION INTRAVENOUS
Qty: 5 | Refills: 3 | Status: ACTIVE | COMMUNITY
Start: 2018-02-19

## 2024-02-12 RX ORDER — INFLIXIMAB 100 MG/10ML
AS DIRECTED INJECTION, POWDER, LYOPHILIZED, FOR SOLUTION INTRAVENOUS
Status: ACTIVE | COMMUNITY
Start: 2024-01-10

## 2024-02-12 RX ORDER — ONDANSETRON HYDROCHLORIDE 4 MG/1
1 TABLET TABLET, FILM COATED ORAL ONCE A DAY
Qty: 1 | Refills: 0 | Status: ACTIVE | COMMUNITY
Start: 2023-03-02

## 2024-02-12 RX ORDER — PREDNISONE 10 MG/1
1 TABLET TABLET ORAL ONCE A DAY
Qty: 7 TABLET | Refills: 0 | Status: ACTIVE | COMMUNITY
Start: 2024-01-01

## 2024-02-12 RX ORDER — METRONIDAZOLE 500 MG/1
1 TABLET TABLET ORAL THREE TIMES A DAY
Qty: 30 TABLET | Refills: 0 | Status: ACTIVE | COMMUNITY
Start: 2024-01-01

## 2024-02-12 RX ORDER — CETIRIZINE HYDROCHLORIDE 10 MG/1
TAKE 1 TABLET BY MOUTH EVERY DAY FOR 30 DAYS TABLET ORAL
Qty: 90 TABLET | Refills: 1 | Status: ACTIVE | COMMUNITY

## 2024-02-15 ENCOUNTER — LAB (OUTPATIENT)
Dept: URBAN - METROPOLITAN AREA CLINIC 4 | Facility: CLINIC | Age: 34
End: 2024-02-15
Payer: COMMERCIAL

## 2024-02-15 ENCOUNTER — COLON (OUTPATIENT)
Dept: URBAN - METROPOLITAN AREA SURGERY CENTER 13 | Facility: SURGERY CENTER | Age: 34
End: 2024-02-15
Payer: COMMERCIAL

## 2024-02-15 DIAGNOSIS — K63.89 OTHER SPECIFIED DISEASES OF INTESTINE: ICD-10-CM

## 2024-02-15 DIAGNOSIS — K56.699 STENOSIS OF COLON: ICD-10-CM

## 2024-02-15 DIAGNOSIS — Z98.0 INTESTINAL BYPASS AND ANASTOMOSIS STATUS: ICD-10-CM

## 2024-02-15 DIAGNOSIS — K50.80 CROHN'S DISEASE OF BOTH SMALL AND LARGE INTESTINE WITHOUT COMPLICATION: ICD-10-CM

## 2024-02-15 PROCEDURE — 45380 COLONOSCOPY AND BIOPSY: CPT | Performed by: INTERNAL MEDICINE

## 2024-02-15 PROCEDURE — 88305 TISSUE EXAM BY PATHOLOGIST: CPT | Performed by: PATHOLOGY

## 2024-03-29 NOTE — PHYSICAL EXAM NEUROLOGIC:
oriented to person, place and time , normal sensation , short and long term memory intact 4 = No assist / stand by assistance

## 2024-05-31 ENCOUNTER — TELEPHONE ENCOUNTER (OUTPATIENT)
Dept: URBAN - METROPOLITAN AREA CLINIC 97 | Facility: CLINIC | Age: 34
End: 2024-05-31

## 2024-06-05 ENCOUNTER — TELEPHONE ENCOUNTER (OUTPATIENT)
Dept: URBAN - METROPOLITAN AREA CLINIC 97 | Facility: CLINIC | Age: 34
End: 2024-06-05

## 2024-06-05 RX ORDER — ACETAMINOPHEN 650 MG
2 TABLETS AS NEEDED TABLET, EXTENDED RELEASE ORAL
Qty: 6 | Refills: 0 | OUTPATIENT
Start: 2024-06-05 | End: 2024-06-06

## 2024-06-05 RX ORDER — ONDANSETRON 2 MG/ML
AS DIRECTED INJECTION, SOLUTION INTRAMUSCULAR; INTRAVENOUS
OUTPATIENT
Start: 2024-06-05

## 2024-06-05 RX ORDER — DIPHENHYDRAMINE HCL 2 %
1 CAPSULE AT BEDTIME AS NEEDED CREAM (GRAM) TOPICAL ONCE A DAY
Qty: 30 | Refills: 0 | OUTPATIENT
Start: 2024-06-05 | End: 2024-07-05

## 2024-06-06 ENCOUNTER — OFFICE VISIT (OUTPATIENT)
Dept: URBAN - METROPOLITAN AREA CLINIC 97 | Facility: CLINIC | Age: 34
End: 2024-06-06
Payer: COMMERCIAL

## 2024-06-06 VITALS
WEIGHT: 176 LBS | RESPIRATION RATE: 18 BRPM | DIASTOLIC BLOOD PRESSURE: 71 MMHG | BODY MASS INDEX: 23.33 KG/M2 | TEMPERATURE: 97.1 F | SYSTOLIC BLOOD PRESSURE: 123 MMHG | HEART RATE: 87 BPM | HEIGHT: 73 IN

## 2024-06-06 DIAGNOSIS — K50.80 CROHN'S COLITIS: ICD-10-CM

## 2024-06-06 PROCEDURE — 96375 TX/PRO/DX INJ NEW DRUG ADDON: CPT | Performed by: INTERNAL MEDICINE

## 2024-06-06 PROCEDURE — 96413 CHEMO IV INFUSION 1 HR: CPT | Performed by: INTERNAL MEDICINE

## 2024-06-06 PROCEDURE — 96415 CHEMO IV INFUSION ADDL HR: CPT | Performed by: INTERNAL MEDICINE

## 2024-06-06 RX ORDER — ONDANSETRON HYDROCHLORIDE 4 MG/1
1 TABLET TABLET, FILM COATED ORAL ONCE A DAY
Qty: 1 | Refills: 0 | Status: ACTIVE | COMMUNITY
Start: 2023-03-02

## 2024-06-06 RX ORDER — CETIRIZINE HYDROCHLORIDE 10 MG/1
TAKE 1 TABLET BY MOUTH EVERY DAY FOR 30 DAYS TABLET ORAL
Qty: 90 TABLET | Refills: 1 | Status: ACTIVE | COMMUNITY

## 2024-06-06 RX ORDER — ONDANSETRON 2 MG/ML
AS DIRECTED INJECTION, SOLUTION INTRAMUSCULAR; INTRAVENOUS
Status: ACTIVE | COMMUNITY
Start: 2024-06-05

## 2024-06-06 RX ORDER — PREDNISONE 5 MG/1
1 TABLET TABLET ORAL ONCE A DAY
Qty: 90 TABLET | Refills: 0 | Status: ACTIVE | COMMUNITY
Start: 2024-06-03 | End: 2024-09-01

## 2024-06-06 RX ORDER — PREDNISONE 10 MG/1
1 TABLET TABLET ORAL ONCE A DAY
Qty: 7 TABLET | Refills: 0 | Status: ACTIVE | COMMUNITY
Start: 2024-01-01

## 2024-06-06 RX ORDER — INFLIXIMAB 100 MG/10ML
AS DIRECTED INJECTION, POWDER, LYOPHILIZED, FOR SOLUTION INTRAVENOUS
Status: ACTIVE | COMMUNITY
Start: 2024-01-10

## 2024-06-06 RX ORDER — DIPHENHYDRAMINE HCL 2 %
1 CAPSULE AT BEDTIME AS NEEDED CREAM (GRAM) TOPICAL ONCE A DAY
Qty: 30 | Refills: 0 | Status: ACTIVE | COMMUNITY
Start: 2024-06-05 | End: 2024-07-05

## 2024-06-06 RX ORDER — INFLIXIMAB 100 MG/10ML
INFUSE 5 MG/KG OVER NO LESS THAN 2 HOUR(S) BY INTRAVENOUS ROUTE  START Q0,2 6 WEEKS AND THEN Q 8 WEEKS FOR 90 DAYS INJECTION, POWDER, LYOPHILIZED, FOR SOLUTION INTRAVENOUS
Qty: 5 | Refills: 3 | Status: ACTIVE | COMMUNITY
Start: 2018-02-19

## 2024-06-06 RX ORDER — ACETAMINOPHEN 650 MG
2 TABLETS AS NEEDED TABLET, EXTENDED RELEASE ORAL
Qty: 6 | Refills: 0 | Status: ACTIVE | COMMUNITY
Start: 2024-06-05 | End: 2024-06-06

## 2024-06-06 RX ORDER — METRONIDAZOLE 500 MG/1
1 TABLET TABLET ORAL THREE TIMES A DAY
Qty: 30 TABLET | Refills: 0 | Status: ACTIVE | COMMUNITY
Start: 2024-01-01

## 2024-07-31 ENCOUNTER — OFFICE VISIT (OUTPATIENT)
Dept: URBAN - METROPOLITAN AREA CLINIC 97 | Facility: CLINIC | Age: 34
End: 2024-07-31
Payer: COMMERCIAL

## 2024-07-31 VITALS
HEART RATE: 85 BPM | HEIGHT: 73 IN | DIASTOLIC BLOOD PRESSURE: 74 MMHG | SYSTOLIC BLOOD PRESSURE: 128 MMHG | BODY MASS INDEX: 23.72 KG/M2 | RESPIRATION RATE: 20 BRPM | WEIGHT: 179 LBS | TEMPERATURE: 98.4 F

## 2024-07-31 DIAGNOSIS — K50.80 CROHN'S COLITIS: ICD-10-CM

## 2024-07-31 PROCEDURE — 96415 CHEMO IV INFUSION ADDL HR: CPT | Performed by: INTERNAL MEDICINE

## 2024-07-31 PROCEDURE — 96375 TX/PRO/DX INJ NEW DRUG ADDON: CPT | Performed by: INTERNAL MEDICINE

## 2024-07-31 PROCEDURE — 96413 CHEMO IV INFUSION 1 HR: CPT | Performed by: INTERNAL MEDICINE

## 2024-07-31 RX ORDER — PREDNISONE 10 MG/1
1 TABLET TABLET ORAL ONCE A DAY
Qty: 7 TABLET | Refills: 0 | Status: ACTIVE | COMMUNITY
Start: 2024-01-01

## 2024-07-31 RX ORDER — INFLIXIMAB 100 MG/10ML
INFUSE 5 MG/KG OVER NO LESS THAN 2 HOUR(S) BY INTRAVENOUS ROUTE  START Q0,2 6 WEEKS AND THEN Q 8 WEEKS FOR 90 DAYS INJECTION, POWDER, LYOPHILIZED, FOR SOLUTION INTRAVENOUS
Qty: 5 | Refills: 3 | Status: ACTIVE | COMMUNITY
Start: 2018-02-19

## 2024-07-31 RX ORDER — ONDANSETRON HYDROCHLORIDE 4 MG/1
1 TABLET TABLET, FILM COATED ORAL ONCE A DAY
Qty: 1 | Refills: 0 | Status: ACTIVE | COMMUNITY
Start: 2023-03-02

## 2024-07-31 RX ORDER — METRONIDAZOLE 500 MG/1
1 TABLET TABLET ORAL THREE TIMES A DAY
Qty: 30 TABLET | Refills: 0 | Status: ACTIVE | COMMUNITY
Start: 2024-01-01

## 2024-07-31 RX ORDER — CETIRIZINE HYDROCHLORIDE 10 MG/1
TAKE 1 TABLET BY MOUTH EVERY DAY FOR 30 DAYS TABLET ORAL
Qty: 90 TABLET | Refills: 1 | Status: ACTIVE | COMMUNITY

## 2024-07-31 RX ORDER — ONDANSETRON 2 MG/ML
AS DIRECTED INJECTION, SOLUTION INTRAMUSCULAR; INTRAVENOUS
Status: ACTIVE | COMMUNITY
Start: 2024-06-05

## 2024-07-31 RX ORDER — INFLIXIMAB 100 MG/10ML
AS DIRECTED INJECTION, POWDER, LYOPHILIZED, FOR SOLUTION INTRAVENOUS
Status: ACTIVE | COMMUNITY
Start: 2024-01-10

## 2024-07-31 RX ORDER — PREDNISONE 5 MG/1
1 TABLET TABLET ORAL ONCE A DAY
Qty: 90 TABLET | Refills: 0 | Status: ACTIVE | COMMUNITY
Start: 2024-06-03 | End: 2024-09-01

## 2024-09-25 ENCOUNTER — OFFICE VISIT (OUTPATIENT)
Dept: URBAN - METROPOLITAN AREA CLINIC 97 | Facility: CLINIC | Age: 34
End: 2024-09-25
Payer: COMMERCIAL

## 2024-09-25 VITALS
TEMPERATURE: 98.2 F | WEIGHT: 183 LBS | HEART RATE: 100 BPM | DIASTOLIC BLOOD PRESSURE: 84 MMHG | HEIGHT: 73 IN | BODY MASS INDEX: 24.25 KG/M2 | SYSTOLIC BLOOD PRESSURE: 122 MMHG | RESPIRATION RATE: 18 BRPM

## 2024-09-25 DIAGNOSIS — K50.80 CROHN'S COLITIS: ICD-10-CM

## 2024-09-25 PROCEDURE — 96413 CHEMO IV INFUSION 1 HR: CPT | Performed by: INTERNAL MEDICINE

## 2024-09-25 PROCEDURE — 96365 THER/PROPH/DIAG IV INF INIT: CPT | Performed by: INTERNAL MEDICINE

## 2024-09-25 PROCEDURE — 96415 CHEMO IV INFUSION ADDL HR: CPT | Performed by: INTERNAL MEDICINE

## 2024-09-25 RX ORDER — PREDNISONE 10 MG/1
1 TABLET TABLET ORAL ONCE A DAY
Qty: 7 TABLET | Refills: 0 | Status: ACTIVE | COMMUNITY
Start: 2024-01-01

## 2024-09-25 RX ORDER — CETIRIZINE HYDROCHLORIDE 10 MG/1
TAKE 1 TABLET BY MOUTH EVERY DAY FOR 30 DAYS TABLET ORAL
Qty: 90 TABLET | Refills: 1 | Status: ACTIVE | COMMUNITY

## 2024-09-25 RX ORDER — METRONIDAZOLE 500 MG/1
1 TABLET TABLET ORAL THREE TIMES A DAY
Qty: 30 TABLET | Refills: 0 | Status: ACTIVE | COMMUNITY
Start: 2024-01-01

## 2024-09-25 RX ORDER — INFLIXIMAB 100 MG/10ML
INFUSE 5 MG/KG OVER NO LESS THAN 2 HOUR(S) BY INTRAVENOUS ROUTE  START Q0,2 6 WEEKS AND THEN Q 8 WEEKS FOR 90 DAYS INJECTION, POWDER, LYOPHILIZED, FOR SOLUTION INTRAVENOUS
Qty: 5 | Refills: 3 | Status: ACTIVE | COMMUNITY
Start: 2018-02-19

## 2024-09-25 RX ORDER — INFLIXIMAB 100 MG/10ML
AS DIRECTED INJECTION, POWDER, LYOPHILIZED, FOR SOLUTION INTRAVENOUS
Status: ACTIVE | COMMUNITY
Start: 2024-01-10

## 2024-09-25 RX ORDER — ONDANSETRON 2 MG/ML
AS DIRECTED INJECTION, SOLUTION INTRAMUSCULAR; INTRAVENOUS
Status: ACTIVE | COMMUNITY
Start: 2024-06-05

## 2024-09-25 RX ORDER — ONDANSETRON HYDROCHLORIDE 4 MG/1
1 TABLET TABLET, FILM COATED ORAL ONCE A DAY
Qty: 1 | Refills: 0 | Status: ACTIVE | COMMUNITY
Start: 2023-03-02

## 2025-03-12 ENCOUNTER — TELEPHONE ENCOUNTER (OUTPATIENT)
Dept: URBAN - METROPOLITAN AREA CLINIC 6 | Facility: CLINIC | Age: 35
End: 2025-03-12

## 2025-03-24 ENCOUNTER — WEB ENCOUNTER (OUTPATIENT)
Dept: URBAN - METROPOLITAN AREA CLINIC 115 | Facility: CLINIC | Age: 35
End: 2025-03-24

## 2025-03-24 ENCOUNTER — TELEPHONE ENCOUNTER (OUTPATIENT)
Dept: URBAN - METROPOLITAN AREA CLINIC 92 | Facility: CLINIC | Age: 35
End: 2025-03-24

## 2025-03-24 RX ORDER — PREDNISONE 10 MG/1
1 TABLET TABLET ORAL ONCE A DAY
Qty: 7 TABLET | Refills: 0
Start: 2024-01-01

## 2025-03-25 ENCOUNTER — OFFICE VISIT (OUTPATIENT)
Dept: URBAN - METROPOLITAN AREA CLINIC 82 | Facility: CLINIC | Age: 35
End: 2025-03-25
Payer: MEDICARE

## 2025-03-25 DIAGNOSIS — K50.80 CROHN'S DISEASE OF BOTH SMALL AND LARGE INTESTINE WITHOUT COMPLICATION: ICD-10-CM

## 2025-03-25 PROCEDURE — 99214 OFFICE O/P EST MOD 30 MIN: CPT | Performed by: INTERNAL MEDICINE

## 2025-03-25 RX ORDER — METRONIDAZOLE 500 MG/1
1 TABLET TABLET ORAL THREE TIMES A DAY
Qty: 30 TABLET | Refills: 0 | Status: ACTIVE | COMMUNITY
Start: 2024-01-01

## 2025-03-25 RX ORDER — PREDNISONE 10 MG/1
4TAB/DAY 1WEEK,3TAB/DAY 2ND WEEK,2TABS/DAY 3RD WEEK ,1 TAB/DAY FOR 4TH WEEK AND STOP TABLET ORAL ONCE A DAY
Qty: 70 TABLET | Refills: 0 | OUTPATIENT
Start: 2025-03-25

## 2025-03-25 RX ORDER — ONDANSETRON 2 MG/ML
AS DIRECTED INJECTION, SOLUTION INTRAMUSCULAR; INTRAVENOUS
Status: ACTIVE | COMMUNITY
Start: 2024-06-05

## 2025-03-25 RX ORDER — CETIRIZINE HYDROCHLORIDE 10 MG/1
TAKE 1 TABLET BY MOUTH EVERY DAY FOR 30 DAYS TABLET ORAL
Qty: 90 TABLET | Refills: 1 | Status: ACTIVE | COMMUNITY

## 2025-03-25 RX ORDER — ONDANSETRON HYDROCHLORIDE 4 MG/1
1 TABLET TABLET, FILM COATED ORAL ONCE A DAY
Qty: 1 | Refills: 0 | Status: ACTIVE | COMMUNITY
Start: 2023-03-02

## 2025-03-25 RX ORDER — RISANKIZUMAB-RZAA 60 MG/ML
INJECTION INTRAVENOUS
OUTPATIENT
Start: 2025-03-25

## 2025-03-25 RX ORDER — INFLIXIMAB 100 MG/10ML
AS DIRECTED INJECTION, POWDER, LYOPHILIZED, FOR SOLUTION INTRAVENOUS
Status: ON HOLD | COMMUNITY
Start: 2024-01-10

## 2025-03-25 RX ORDER — PREDNISONE 10 MG/1
1 TABLET TABLET ORAL ONCE A DAY
Qty: 7 TABLET | Refills: 0 | Status: ACTIVE | COMMUNITY
Start: 2024-01-01

## 2025-03-25 RX ORDER — INFLIXIMAB 100 MG/10ML
INFUSE 5 MG/KG OVER NO LESS THAN 2 HOUR(S) BY INTRAVENOUS ROUTE  START Q0,2 6 WEEKS AND THEN Q 8 WEEKS FOR 90 DAYS INJECTION, POWDER, LYOPHILIZED, FOR SOLUTION INTRAVENOUS
Qty: 5 | Refills: 3 | Status: ON HOLD | COMMUNITY
Start: 2018-02-19

## 2025-03-25 RX ORDER — RISANKIZUMAB-RZAA 360 MG/2.4
AT WEEK 12 WEARABLE INJECTOR SUBCUTANEOUS
Qty: 360 | Refills: 0 | OUTPATIENT
Start: 2025-03-25

## 2025-03-25 NOTE — HPI-TODAY'S VISIT:
33-year-old male with a history of ileocolonic Crohn's disease complicated with a history of bowel obstruction requiring ileocolonic anastomosis currently has been on Remicade every 8 weeks 5 mg/kg came into the office for his yearly follow-up.  He has not had a TB testing in the past year.  Patient reports after 2 weeks after receiving the Remicade infusion she he started having abdominal pain cramping and diarrhea denies any sick contacts.  Patient stated he responded well after he had taken Medrol Dosepak.  Denies any unintentional weight loss denies any NSAID use.  Prior colonoscopy procedures for 2021 were reviewed.  Patient denies any nausea vomiting denies any bowel obstructive symptoms since he was last seen.  3/25/25 : Patient was seen today for complaints of having worsening of her colitis symptoms for the past week.  Patient stated he is under extreme stress and he has not been on Remicade for since September 2024 because of inconvenience to come in for the infusions as well as the job changes and under insurance.  Patient denies any significant weight loss he reports since he started having colitis symptoms he has been having loose stools mucousy stools as well as significant abdominal cramping.  Denies any travel history denies any recent

## 2025-03-26 ENCOUNTER — TELEPHONE ENCOUNTER (OUTPATIENT)
Dept: URBAN - METROPOLITAN AREA CLINIC 23 | Facility: CLINIC | Age: 35
End: 2025-03-26

## 2025-03-26 RX ORDER — RISANKIZUMAB-RZAA 360 MG/2.4
AT WEEK 12 WEARABLE INJECTOR SUBCUTANEOUS
Qty: 360 | Refills: 0 | OUTPATIENT
Start: 2025-03-26

## 2025-03-26 RX ORDER — RISANKIZUMAB-RZAA 60 MG/ML
AS DIRECTED INJECTION INTRAVENOUS
Qty: 600 | Refills: 0 | OUTPATIENT
Start: 2025-03-26

## 2025-03-27 ENCOUNTER — TELEPHONE ENCOUNTER (OUTPATIENT)
Dept: URBAN - METROPOLITAN AREA CLINIC 82 | Facility: CLINIC | Age: 35
End: 2025-03-27

## 2025-03-28 ENCOUNTER — OFFICE VISIT (OUTPATIENT)
Dept: URBAN - METROPOLITAN AREA CLINIC 115 | Facility: CLINIC | Age: 35
End: 2025-03-28

## 2025-03-28 LAB
ABSOLUTE BASOPHILS: 32
ABSOLUTE EOSINOPHILS: 58
ABSOLUTE LYMPHOCYTES: 1924
ABSOLUTE MONOCYTES: 509
ABSOLUTE NEUTROPHILS: 2777
ALBUMIN/GLOBULIN RATIO: 1.5
ALBUMIN: 4.1
ALKALINE PHOSPHATASE: 80
ALT: 17
AST: 16
BASOPHILS: 0.6
BILIRUBIN, TOTAL: 0.6
BUN/CREATININE RATIO: (no result)
C-REACTIVE PROTEIN, QUANT: <3
CALCIUM: 9.1
CARBON DIOXIDE: 24
CHLORIDE: 103
CREATININE: 1.1
EOSINOPHILS: 1.1
GLOBULIN: 2.7
GLUCOSE: 86
HEMATOCRIT: 44.8
HEMOGLOBIN: 15.2
HEPATITIS B CORE AB TOTAL: (no result)
HEPATITIS B SURFACE ANTIGEN: (no result)
LYMPHOCYTES: 36.3
MCH: 29.6
MCHC: 33.9
MCV: 87.2
MITOGEN-NIL: 6.48
MONOCYTES: 9.6
MPV: 11.5
NEUTROPHILS: 52.4
PLATELET COUNT: 243
POTASSIUM: 4.4
PROTEIN, TOTAL: 6.8
QUANTIFERON NIL VALUE: 1.88
QUANTIFERON TB1 AG VALUE: 0.2
QUANTIFERON TB2 AG VALUE: 0.21
QUANTIFERON-TB GOLD PLUS: NEGATIVE
RDW: 12.4
RED BLOOD CELL COUNT: 5.14
SODIUM: 137
UREA NITROGEN (BUN): 13
WHITE BLOOD CELL COUNT: 5.3

## 2025-04-10 ENCOUNTER — OFFICE VISIT (OUTPATIENT)
Dept: URBAN - METROPOLITAN AREA CLINIC 91 | Facility: CLINIC | Age: 35
End: 2025-04-10
Payer: COMMERCIAL

## 2025-04-10 DIAGNOSIS — K50.80 CROHN'S DISEASE OF BOTH SMALL AND LARGE INTESTINE WITHOUT COMPLICATION: ICD-10-CM

## 2025-04-10 PROCEDURE — 96413 CHEMO IV INFUSION 1 HR: CPT | Performed by: INTERNAL MEDICINE

## 2025-04-10 RX ORDER — RISANKIZUMAB-RZAA 60 MG/ML
AS DIRECTED INJECTION INTRAVENOUS
Qty: 600 | Refills: 0 | Status: ACTIVE | COMMUNITY
Start: 2025-03-26

## 2025-04-10 RX ORDER — INFLIXIMAB 100 MG/10ML
AS DIRECTED INJECTION, POWDER, LYOPHILIZED, FOR SOLUTION INTRAVENOUS
Status: ON HOLD | COMMUNITY
Start: 2024-01-10

## 2025-04-10 RX ORDER — INFLIXIMAB 100 MG/10ML
INFUSE 5 MG/KG OVER NO LESS THAN 2 HOUR(S) BY INTRAVENOUS ROUTE  START Q0,2 6 WEEKS AND THEN Q 8 WEEKS FOR 90 DAYS INJECTION, POWDER, LYOPHILIZED, FOR SOLUTION INTRAVENOUS
Qty: 5 | Refills: 3 | Status: ON HOLD | COMMUNITY
Start: 2018-02-19

## 2025-04-10 RX ORDER — METRONIDAZOLE 500 MG/1
1 TABLET TABLET ORAL THREE TIMES A DAY
Qty: 30 TABLET | Refills: 0 | Status: ACTIVE | COMMUNITY
Start: 2024-01-01

## 2025-04-10 RX ORDER — RISANKIZUMAB-RZAA 360 MG/2.4
AT WEEK 12 WEARABLE INJECTOR SUBCUTANEOUS
Qty: 360 | Refills: 0 | Status: ACTIVE | COMMUNITY
Start: 2025-03-25

## 2025-04-10 RX ORDER — ONDANSETRON HYDROCHLORIDE 4 MG/1
1 TABLET TABLET, FILM COATED ORAL ONCE A DAY
Qty: 1 | Refills: 0 | Status: ACTIVE | COMMUNITY
Start: 2023-03-02

## 2025-04-10 RX ORDER — PREDNISONE 10 MG/1
1 TABLET TABLET ORAL ONCE A DAY
Qty: 7 TABLET | Refills: 0 | Status: ACTIVE | COMMUNITY
Start: 2024-01-01

## 2025-04-10 RX ORDER — RISANKIZUMAB-RZAA 360 MG/2.4
AT WEEK 12 WEARABLE INJECTOR SUBCUTANEOUS
Qty: 360 | Refills: 0 | Status: ACTIVE | COMMUNITY
Start: 2025-03-26

## 2025-04-10 RX ORDER — RISANKIZUMAB-RZAA 60 MG/ML
INJECTION INTRAVENOUS
Status: ACTIVE | COMMUNITY
Start: 2025-03-25

## 2025-04-10 RX ORDER — CETIRIZINE HYDROCHLORIDE 10 MG/1
TAKE 1 TABLET BY MOUTH EVERY DAY FOR 30 DAYS TABLET ORAL
Qty: 90 TABLET | Refills: 1 | Status: ACTIVE | COMMUNITY

## 2025-04-10 RX ORDER — ONDANSETRON 2 MG/ML
AS DIRECTED INJECTION, SOLUTION INTRAMUSCULAR; INTRAVENOUS
Status: ACTIVE | COMMUNITY
Start: 2024-06-05

## 2025-04-10 RX ORDER — PREDNISONE 10 MG/1
4TAB/DAY 1WEEK,3TAB/DAY 2ND WEEK,2TABS/DAY 3RD WEEK ,1 TAB/DAY FOR 4TH WEEK AND STOP TABLET ORAL ONCE A DAY
Qty: 70 TABLET | Refills: 0 | Status: ACTIVE | COMMUNITY
Start: 2025-03-25

## 2025-05-08 ENCOUNTER — OFFICE VISIT (OUTPATIENT)
Dept: URBAN - METROPOLITAN AREA CLINIC 91 | Facility: CLINIC | Age: 35
End: 2025-05-08

## 2025-05-22 ENCOUNTER — TELEPHONE ENCOUNTER (OUTPATIENT)
Dept: URBAN - METROPOLITAN AREA CLINIC 91 | Facility: CLINIC | Age: 35
End: 2025-05-22

## 2025-05-29 ENCOUNTER — OFFICE VISIT (OUTPATIENT)
Dept: URBAN - METROPOLITAN AREA CLINIC 91 | Facility: CLINIC | Age: 35
End: 2025-05-29
Payer: COMMERCIAL

## 2025-05-29 DIAGNOSIS — K50.80 CROHN'S DISEASE OF BOTH SMALL AND LARGE INTESTINE WITHOUT COMPLICATION: ICD-10-CM

## 2025-05-29 PROCEDURE — 96413 CHEMO IV INFUSION 1 HR: CPT | Performed by: INTERNAL MEDICINE

## 2025-05-29 RX ORDER — RISANKIZUMAB-RZAA 360 MG/2.4
AT WEEK 12 WEARABLE INJECTOR SUBCUTANEOUS
Qty: 360 | Refills: 0 | Status: ACTIVE | COMMUNITY
Start: 2025-03-25

## 2025-05-29 RX ORDER — PREDNISONE 10 MG/1
4TAB/DAY 1WEEK,3TAB/DAY 2ND WEEK,2TABS/DAY 3RD WEEK ,1 TAB/DAY FOR 4TH WEEK AND STOP TABLET ORAL ONCE A DAY
Qty: 70 TABLET | Refills: 0 | Status: ACTIVE | COMMUNITY
Start: 2025-03-25

## 2025-05-29 RX ORDER — PREDNISONE 10 MG/1
1 TABLET TABLET ORAL ONCE A DAY
Qty: 7 TABLET | Refills: 0 | Status: ACTIVE | COMMUNITY
Start: 2024-01-01

## 2025-05-29 RX ORDER — METRONIDAZOLE 500 MG/1
1 TABLET TABLET ORAL THREE TIMES A DAY
Qty: 30 TABLET | Refills: 0 | Status: ACTIVE | COMMUNITY
Start: 2024-01-01

## 2025-05-29 RX ORDER — RISANKIZUMAB-RZAA 60 MG/ML
INJECTION INTRAVENOUS
Status: ACTIVE | COMMUNITY
Start: 2025-03-25

## 2025-05-29 RX ORDER — RISANKIZUMAB-RZAA 60 MG/ML
AS DIRECTED INJECTION INTRAVENOUS
Qty: 600 | Refills: 0 | Status: ACTIVE | COMMUNITY
Start: 2025-03-26

## 2025-05-29 RX ORDER — ONDANSETRON 2 MG/ML
AS DIRECTED INJECTION, SOLUTION INTRAMUSCULAR; INTRAVENOUS
Status: ACTIVE | COMMUNITY
Start: 2024-06-05

## 2025-05-29 RX ORDER — ONDANSETRON HYDROCHLORIDE 4 MG/1
1 TABLET TABLET, FILM COATED ORAL ONCE A DAY
Qty: 1 | Refills: 0 | Status: ACTIVE | COMMUNITY
Start: 2023-03-02

## 2025-05-29 RX ORDER — CETIRIZINE HYDROCHLORIDE 10 MG/1
TAKE 1 TABLET BY MOUTH EVERY DAY FOR 30 DAYS TABLET ORAL
Qty: 90 TABLET | Refills: 1 | Status: ACTIVE | COMMUNITY

## 2025-05-29 RX ORDER — RISANKIZUMAB-RZAA 360 MG/2.4
AT WEEK 12 WEARABLE INJECTOR SUBCUTANEOUS
Qty: 360 | Refills: 0 | Status: ACTIVE | COMMUNITY
Start: 2025-03-26

## 2025-05-29 RX ORDER — INFLIXIMAB 100 MG/10ML
INFUSE 5 MG/KG OVER NO LESS THAN 2 HOUR(S) BY INTRAVENOUS ROUTE  START Q0,2 6 WEEKS AND THEN Q 8 WEEKS FOR 90 DAYS INJECTION, POWDER, LYOPHILIZED, FOR SOLUTION INTRAVENOUS
Qty: 5 | Refills: 3 | Status: ON HOLD | COMMUNITY
Start: 2018-02-19

## 2025-05-29 RX ORDER — INFLIXIMAB 100 MG/10ML
AS DIRECTED INJECTION, POWDER, LYOPHILIZED, FOR SOLUTION INTRAVENOUS
Status: ON HOLD | COMMUNITY
Start: 2024-01-10

## 2025-06-12 ENCOUNTER — OFFICE VISIT (OUTPATIENT)
Dept: URBAN - METROPOLITAN AREA CLINIC 91 | Facility: CLINIC | Age: 35
End: 2025-06-12

## 2025-06-19 ENCOUNTER — OFFICE VISIT (OUTPATIENT)
Dept: URBAN - METROPOLITAN AREA CLINIC 91 | Facility: CLINIC | Age: 35
End: 2025-06-19
Payer: COMMERCIAL

## 2025-06-19 DIAGNOSIS — K50.80 CROHN'S DISEASE OF BOTH SMALL AND LARGE INTESTINE WITHOUT COMPLICATION: ICD-10-CM

## 2025-06-19 PROCEDURE — 96413 CHEMO IV INFUSION 1 HR: CPT | Performed by: INTERNAL MEDICINE

## 2025-06-19 RX ORDER — ONDANSETRON 2 MG/ML
AS DIRECTED INJECTION, SOLUTION INTRAMUSCULAR; INTRAVENOUS
Status: ACTIVE | COMMUNITY
Start: 2024-06-05

## 2025-06-19 RX ORDER — RISANKIZUMAB-RZAA 60 MG/ML
INJECTION INTRAVENOUS
Status: ACTIVE | COMMUNITY
Start: 2025-03-25

## 2025-06-19 RX ORDER — RISANKIZUMAB-RZAA 60 MG/ML
AS DIRECTED INJECTION INTRAVENOUS
Qty: 600 | Refills: 0 | Status: ACTIVE | COMMUNITY
Start: 2025-03-26

## 2025-06-19 RX ORDER — ONDANSETRON HYDROCHLORIDE 4 MG/1
1 TABLET TABLET, FILM COATED ORAL ONCE A DAY
Qty: 1 | Refills: 0 | Status: ACTIVE | COMMUNITY
Start: 2023-03-02

## 2025-06-19 RX ORDER — PREDNISONE 10 MG/1
4TAB/DAY 1WEEK,3TAB/DAY 2ND WEEK,2TABS/DAY 3RD WEEK ,1 TAB/DAY FOR 4TH WEEK AND STOP TABLET ORAL ONCE A DAY
Qty: 70 TABLET | Refills: 0 | Status: ACTIVE | COMMUNITY
Start: 2025-03-25

## 2025-06-19 RX ORDER — RISANKIZUMAB-RZAA 360 MG/2.4
AT WEEK 12 WEARABLE INJECTOR SUBCUTANEOUS
Qty: 360 | Refills: 0 | Status: ACTIVE | COMMUNITY
Start: 2025-03-26

## 2025-06-19 RX ORDER — CETIRIZINE HYDROCHLORIDE 10 MG/1
TAKE 1 TABLET BY MOUTH EVERY DAY FOR 30 DAYS TABLET ORAL
Qty: 90 TABLET | Refills: 1 | Status: ACTIVE | COMMUNITY

## 2025-06-19 RX ORDER — INFLIXIMAB 100 MG/10ML
AS DIRECTED INJECTION, POWDER, LYOPHILIZED, FOR SOLUTION INTRAVENOUS
Status: ON HOLD | COMMUNITY
Start: 2024-01-10

## 2025-06-19 RX ORDER — METRONIDAZOLE 500 MG/1
1 TABLET TABLET ORAL THREE TIMES A DAY
Qty: 30 TABLET | Refills: 0 | Status: ACTIVE | COMMUNITY
Start: 2024-01-01

## 2025-06-19 RX ORDER — PREDNISONE 10 MG/1
1 TABLET TABLET ORAL ONCE A DAY
Qty: 7 TABLET | Refills: 0 | Status: ACTIVE | COMMUNITY
Start: 2024-01-01

## 2025-06-19 RX ORDER — INFLIXIMAB 100 MG/10ML
INFUSE 5 MG/KG OVER NO LESS THAN 2 HOUR(S) BY INTRAVENOUS ROUTE  START Q0,2 6 WEEKS AND THEN Q 8 WEEKS FOR 90 DAYS INJECTION, POWDER, LYOPHILIZED, FOR SOLUTION INTRAVENOUS
Qty: 5 | Refills: 3 | Status: ON HOLD | COMMUNITY
Start: 2018-02-19

## 2025-06-19 RX ORDER — RISANKIZUMAB-RZAA 360 MG/2.4
AT WEEK 12 WEARABLE INJECTOR SUBCUTANEOUS
Qty: 360 | Refills: 0 | Status: ACTIVE | COMMUNITY
Start: 2025-03-25

## 2025-07-08 ENCOUNTER — TELEPHONE ENCOUNTER (OUTPATIENT)
Dept: URBAN - METROPOLITAN AREA CLINIC 115 | Facility: CLINIC | Age: 35
End: 2025-07-08

## 2025-07-08 RX ORDER — RISANKIZUMAB-RZAA 360 MG/2.4
1.2 ML WEARABLE INJECTOR SUBCUTANEOUS
Qty: 1 KIT | Refills: 7 | OUTPATIENT
Start: 2025-07-16

## 2025-07-22 ENCOUNTER — TELEPHONE ENCOUNTER (OUTPATIENT)
Dept: URBAN - METROPOLITAN AREA CLINIC 82 | Facility: CLINIC | Age: 35
End: 2025-07-22

## 2025-07-22 ENCOUNTER — ERX REFILL RESPONSE (OUTPATIENT)
Dept: URBAN - METROPOLITAN AREA CLINIC 115 | Facility: CLINIC | Age: 35
End: 2025-07-22

## 2025-07-22 RX ORDER — RISANKIZUMAB-RZAA 180 MG/1.2
1.2 ML KIT SUBCUTANEOUS
Qty: 1 KIT | Refills: 7 | OUTPATIENT

## 2025-07-22 RX ORDER — RISANKIZUMAB-RZAA 180 MG/1.2
1.2 ML KIT SUBCUTANEOUS
Qty: 1 KIT | Refills: 7 | OUTPATIENT
Start: 2025-07-22

## 2025-07-22 RX ORDER — RISANKIZUMAB-RZAA 360 MG/2.4
1.2 ML WEARABLE INJECTOR SUBCUTANEOUS
Qty: 1 KIT | Refills: 7 | OUTPATIENT

## 2025-07-24 ENCOUNTER — TELEPHONE ENCOUNTER (OUTPATIENT)
Dept: URBAN - METROPOLITAN AREA CLINIC 78 | Facility: CLINIC | Age: 35
End: 2025-07-24

## 2025-07-29 ENCOUNTER — TELEPHONE ENCOUNTER (OUTPATIENT)
Dept: URBAN - METROPOLITAN AREA CLINIC 115 | Facility: CLINIC | Age: 35
End: 2025-07-29

## 2025-07-30 ENCOUNTER — WEB ENCOUNTER (OUTPATIENT)
Dept: URBAN - METROPOLITAN AREA CLINIC 115 | Facility: CLINIC | Age: 35
End: 2025-07-30